# Patient Record
Sex: MALE | Race: WHITE | Employment: PART TIME | URBAN - METROPOLITAN AREA
[De-identification: names, ages, dates, MRNs, and addresses within clinical notes are randomized per-mention and may not be internally consistent; named-entity substitution may affect disease eponyms.]

---

## 2017-06-03 ENCOUNTER — OFFICE VISIT (OUTPATIENT)
Dept: FAMILY MEDICINE CLINIC | Facility: CLINIC | Age: 57
End: 2017-06-03

## 2017-06-03 VITALS — DIASTOLIC BLOOD PRESSURE: 80 MMHG | SYSTOLIC BLOOD PRESSURE: 130 MMHG

## 2017-06-03 DIAGNOSIS — Z91.89 AT RISK FOR ABNORMAL BLOOD GLUCOSE LEVEL: Primary | ICD-10-CM

## 2017-06-03 PROCEDURE — 82962 GLUCOSE BLOOD TEST: CPT | Performed by: NURSE PRACTITIONER

## 2017-06-03 RX ORDER — ATORVASTATIN CALCIUM 40 MG/1
40 TABLET, FILM COATED ORAL DAILY
Refills: 3 | COMMUNITY
Start: 2017-03-11

## 2017-06-03 RX ORDER — HYDROCODONE BITARTRATE AND ACETAMINOPHEN 5; 325 MG/1; MG/1
1 TABLET ORAL AS NEEDED
Refills: 0 | COMMUNITY
Start: 2017-04-08

## 2017-06-03 RX ORDER — LEVOTHYROXINE SODIUM 13 UG/1
13 CAPSULE ORAL DAILY
COMMUNITY

## 2017-06-03 RX ORDER — HYDROCHLOROTHIAZIDE 12.5 MG/1
12.5 CAPSULE, GELATIN COATED ORAL DAILY
Refills: 1 | COMMUNITY
Start: 2017-03-11

## 2017-06-03 RX ORDER — EZETIMIBE 10 MG/1
10 TABLET ORAL DAILY
Refills: 11 | COMMUNITY
Start: 2017-03-21

## 2017-06-03 NOTE — PROGRESS NOTES
Patient with type 2 diabetes. Has not taken DM medications for 2 days. Wants to check blood glucose. Has been dizzy this morning. States he has history of vertigo.     Recent Results (from the past 24 hour(s))  -GLUCOSE BLOOD TEST   Collection Time: 06/03/1

## 2018-08-04 ENCOUNTER — OFFICE VISIT (OUTPATIENT)
Dept: FAMILY MEDICINE CLINIC | Facility: CLINIC | Age: 58
End: 2018-08-04
Payer: COMMERCIAL

## 2018-08-04 VITALS
DIASTOLIC BLOOD PRESSURE: 80 MMHG | HEART RATE: 90 BPM | SYSTOLIC BLOOD PRESSURE: 128 MMHG | WEIGHT: 212 LBS | RESPIRATION RATE: 16 BRPM | OXYGEN SATURATION: 98 % | BODY MASS INDEX: 33 KG/M2 | TEMPERATURE: 98 F

## 2018-08-04 DIAGNOSIS — L23.7 CONTACT DERMATITIS DUE TO POISON IVY: Primary | ICD-10-CM

## 2018-08-04 PROCEDURE — 99213 OFFICE O/P EST LOW 20 MIN: CPT | Performed by: PHYSICIAN ASSISTANT

## 2018-08-04 NOTE — PROGRESS NOTES
CHIEF COMPLAINT:   Patient presents with:  Derm Problem: 2 weeks ago, finished prednisione 2 days ago, itchying, using hydrocortisone and calamine lotion      HPI:   Dillanbrandongemma Kerr is a 59year old male who presents for evaluation of a skin evaluation acute distress  SKIN: mild patchy maculopapular rash on upper extremities bilaterally consistent with dermatitis, no surrounding erythema or discharge noted  HEENT:Head atraumatic, normocephalic.   LUNGS: non labored breathing,  clear to auscultation bilate

## 2018-08-04 NOTE — PATIENT INSTRUCTIONS
Managing a Poison Ivy, [place], or [employer] Reaction  If you come in contact with urushiol    If you think you may have come in contact with the sap oil (called urushiol) contained in poison ivy, poison oak, or poison sumac plants, wash the affect Call your healthcare provider if:  · Your rash is severe  · The rash spreads beyond the exposed part of your body or affects your face.   · The rash does not clear up within a few weeks  You may be given medicine to take by mouth or apply directly on the sk

## 2018-11-19 ENCOUNTER — OFFICE VISIT (OUTPATIENT)
Dept: FAMILY MEDICINE CLINIC | Facility: CLINIC | Age: 58
End: 2018-11-19
Payer: COMMERCIAL

## 2018-11-19 VITALS
SYSTOLIC BLOOD PRESSURE: 128 MMHG | OXYGEN SATURATION: 98 % | RESPIRATION RATE: 14 BRPM | DIASTOLIC BLOOD PRESSURE: 80 MMHG | TEMPERATURE: 98 F | HEART RATE: 76 BPM

## 2018-11-19 DIAGNOSIS — H57.89 REDNESS OF LEFT EYE: Primary | ICD-10-CM

## 2018-11-19 PROCEDURE — 99213 OFFICE O/P EST LOW 20 MIN: CPT | Performed by: NURSE PRACTITIONER

## 2018-11-19 NOTE — PROGRESS NOTES
CHIEF COMPLAINT:   Patient presents with:  Eye Problem: pt here for evaluation of eye pain, believes that he feels something in his left eye, began when his shift started today at 645.       HPI:   Tigre Richmond is a 59year old male who presents with pain or palpitations   GI: denies N/V/C or abdominal pain  NEURO: denies headaches     EXAM:   /80   Pulse 76   Temp 98 °F (36.7 °C) (Oral)   Resp 14   SpO2 98%   GENERAL: well developed, well nourished, and in no apparent distress  SKIN: no rashes, eyes.  Stressed importance of good handwashing, prior to touching eye or eye area. Warm compresses to affected eye prn.  - Stressed that pt must F/U with IC, PCP, or optho if no improvement/worsening within 48-72 hours.   Advised ER or to optho immediately,

## (undated) NOTE — MR AVS SNAPSHOT
[facility]  [address]  [phone]               Thank you for choosing us for your health care visit with Barbara Matias NP.   We are glad to serve you and happy to provide you with this summary of your vi Sign up for [employer], your secure online medical record. [employer] will allow you to access patient instructions from your recent visit,  view other health information, and more. To sign up or find more information, go to [email or web address]. org and cl increments are effective and add up over the week   2 ½ hours per week – spread out over time Use a tonya to keep you motivated   Don’t forget strength training with weights and resistance Set goals and track your progress   You don’t need to join a gym.